# Patient Record
Sex: FEMALE | Race: WHITE | URBAN - METROPOLITAN AREA
[De-identification: names, ages, dates, MRNs, and addresses within clinical notes are randomized per-mention and may not be internally consistent; named-entity substitution may affect disease eponyms.]

---

## 2017-02-01 ENCOUNTER — OUTPATIENT (OUTPATIENT)
Dept: OUTPATIENT SERVICES | Facility: HOSPITAL | Age: 27
LOS: 1 days | Discharge: HOME | End: 2017-02-01

## 2017-06-27 DIAGNOSIS — R73.02 IMPAIRED GLUCOSE TOLERANCE (ORAL): ICD-10-CM

## 2017-06-27 DIAGNOSIS — E03.9 HYPOTHYROIDISM, UNSPECIFIED: ICD-10-CM

## 2017-08-09 ENCOUNTER — OUTPATIENT (OUTPATIENT)
Dept: OUTPATIENT SERVICES | Facility: HOSPITAL | Age: 27
LOS: 1 days | Discharge: HOME | End: 2017-08-09

## 2017-08-09 DIAGNOSIS — Z00.00 ENCOUNTER FOR GENERAL ADULT MEDICAL EXAMINATION WITHOUT ABNORMAL FINDINGS: ICD-10-CM

## 2017-08-09 DIAGNOSIS — O99.89 OTHER SPECIFIED DISEASES AND CONDITIONS COMPLICATING PREGNANCY, CHILDBIRTH AND THE PUERPERIUM: ICD-10-CM

## 2020-05-30 ENCOUNTER — EMERGENCY (EMERGENCY)
Facility: HOSPITAL | Age: 30
LOS: 0 days | Discharge: HOME | End: 2020-05-30
Attending: EMERGENCY MEDICINE | Admitting: EMERGENCY MEDICINE
Payer: MEDICAID

## 2020-05-30 VITALS
WEIGHT: 160.06 LBS | OXYGEN SATURATION: 100 % | DIASTOLIC BLOOD PRESSURE: 85 MMHG | SYSTOLIC BLOOD PRESSURE: 137 MMHG | HEART RATE: 100 BPM | RESPIRATION RATE: 19 BRPM | TEMPERATURE: 98 F | HEIGHT: 64 IN
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
## 2020-05-30 DIAGNOSIS — S92.354A NONDISPLACED FRACTURE OF FIFTH METATARSAL BONE, RIGHT FOOT, INITIAL ENCOUNTER FOR CLOSED FRACTURE: ICD-10-CM
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
## 2020-05-30 PROCEDURE — 29515 APPLICATION SHORT LEG SPLINT: CPT

## 2020-05-30 PROCEDURE — 73630 X-RAY EXAM OF FOOT: CPT | Mod: 26,RT

## 2020-05-30 PROCEDURE — 73610 X-RAY EXAM OF ANKLE: CPT | Mod: 26,RT

## 2020-05-30 PROCEDURE — 99284 EMERGENCY DEPT VISIT MOD MDM: CPT | Mod: 25

## 2020-05-30 RX ORDER — IBUPROFEN 200 MG
1 TABLET ORAL
Qty: 28 | Refills: 0
Start: 2020-05-30 | End: 2020-06-05

## 2020-05-30 NOTE — ED PROVIDER NOTE - CLINICAL SUMMARY MEDICAL DECISION MAKING FREE TEXT BOX
Patient presents for evaluation of foot pain worst at the lateral aspect after sustaining an eversion injury.  xray reveals 5th metatarsal fracture she was placed in splint and discharged with follow up to podiatry

## 2020-05-30 NOTE — ED ADULT NURSE NOTE - NSIMPLEMENTINTERV_GEN_ALL_ED
Implemented All Universal Safety Interventions:  [place] to call system. Call bell, personal items and telephone within reach. Instruct patient to call for assistance. Room bathroom lighting operational. Non-slip footwear when patient is off stretcher. Physically safe environment: no spills, clutter or unnecessary equipment. Stretcher in lowest position, wheels locked, appropriate side rails in place.

## 2020-05-30 NOTE — ED PROVIDER NOTE - ATTENDING CONTRIBUTION TO CARE
I was present for and supervised the key and critical aspects of the procedures performed during the care of the patient.  Patient presents for evaluation of foot pain worst at the lateral aspect after sustaining an eversion injury.  xray reveals 5th metatarsal fracture she was placed in splint and discharged with follow up to podiatry

## 2020-05-30 NOTE — ED PROVIDER NOTE - OBJECTIVE STATEMENT
30 y/o female presents to the ED c/o "I stepped on half filled bin to turn on ac and it fell in causing to twist and injure right foot/ ankle at 1 am. I have a lot of pain and swelling." no head trauma/ LOC

## 2020-05-30 NOTE — ED PROVIDER NOTE - NSFOLLOWUPCLINICS_GEN_ALL_ED_FT
[facility]  Podiatry  .  [place]   Phone: [phone]  Fax:   Follow Up Time:

## 2020-05-30 NOTE — ED PROVIDER NOTE - PATIENT PORTAL LINK FT
You can access the FollowMyHealth Patient Portal offered by [facility] by registering at the following website: [email or web address]. By joining [employer]’s FollowMyHealth portal, you will also be able to view your health information using other applications (apps) compatible with our system.

## 2020-10-12 ENCOUNTER — TRANSCRIPTION ENCOUNTER (OUTPATIENT)
Age: 30
End: 2020-10-12

## 2020-11-20 ENCOUNTER — OUTPATIENT (OUTPATIENT)
Dept: OUTPATIENT SERVICES | Facility: HOSPITAL | Age: 30
LOS: 1 days | Discharge: HOME | End: 2020-11-20
Payer: MEDICAID

## 2020-11-20 PROCEDURE — 58340 CATHETER FOR HYSTEROGRAPHY: CPT

## 2020-11-20 PROCEDURE — 74740 X-RAY FEMALE GENITAL TRACT: CPT | Mod: 26

## 2022-05-31 ENCOUNTER — APPOINTMENT (OUTPATIENT)
Dept: ENDOCRINOLOGY | Facility: CLINIC | Age: 32
End: 2022-05-31
Payer: COMMERCIAL

## 2022-05-31 ENCOUNTER — TRANSCRIPTION ENCOUNTER (OUTPATIENT)
Age: 32
End: 2022-05-31

## 2022-05-31 VITALS
WEIGHT: 170 LBS | TEMPERATURE: 97.2 F | DIASTOLIC BLOOD PRESSURE: 70 MMHG | SYSTOLIC BLOOD PRESSURE: 118 MMHG | OXYGEN SATURATION: 98 % | HEIGHT: 64 IN | HEART RATE: 72 BPM | BODY MASS INDEX: 29.02 KG/M2

## 2022-05-31 DIAGNOSIS — O99.281 ENDOCRINE, NUTRITIONAL AND METABOLIC DISEASES COMPLICATING PREGNANCY, FIRST TRIMESTER: ICD-10-CM

## 2022-05-31 DIAGNOSIS — Z80.0 FAMILY HISTORY OF MALIGNANT NEOPLASM OF DIGESTIVE ORGANS: ICD-10-CM

## 2022-05-31 DIAGNOSIS — Z82.49 FAMILY HISTORY OF ISCHEMIC HEART DISEASE AND OTHER DISEASES OF THE CIRCULATORY SYSTEM: ICD-10-CM

## 2022-05-31 DIAGNOSIS — E03.9 ENDOCRINE, NUTRITIONAL AND METABOLIC DISEASES COMPLICATING PREGNANCY, FIRST TRIMESTER: ICD-10-CM

## 2022-05-31 DIAGNOSIS — Z83.49 FAMILY HISTORY OF OTHER ENDOCRINE, NUTRITIONAL AND METABOLIC DISEASES: ICD-10-CM

## 2022-05-31 PROCEDURE — 99203 OFFICE O/P NEW LOW 30 MIN: CPT

## 2022-05-31 RX ORDER — LEVOTHYROXINE SODIUM 0.12 MG/1
125 TABLET ORAL DAILY
Qty: 90 | Refills: 2 | Status: ACTIVE | COMMUNITY
Start: 2022-05-31 | End: 1900-01-01

## 2022-05-31 NOTE — REVIEW OF SYSTEMS
[Fatigue] : no fatigue [Recent Weight Gain (___ Lbs)] : no recent weight gain [Recent Weight Loss (___ Lbs)] : no recent weight loss [Dysphagia] : no dysphagia [Neck Pain] : no neck pain [Dysphonia] : no dysphonia [Chest Pain] : no chest pain [Palpitations] : no palpitations [Fast Heart Rate] : heart rate is not fast [Lower Ext Edema] : no lower extremity edema [Shortness Of Breath] : no shortness of breath [SOB on Exertion] : no shortness of breath on exertion [Nausea] : no nausea [Constipation] : no constipation [Vomiting] : no vomiting [Diarrhea] : diarrhea [Acne] : acne [Dry Skin] : dry skin [Hirsutism] : hirsutism [Hair Loss] : no hair loss [Headaches] : no headaches [Dizziness] : no dizziness [Tremors] : no tremors [Cold Intolerance] : no cold intolerance [Heat Intolerance] : no heat intolerance

## 2022-05-31 NOTE — PAST MEDICAL HISTORY
[Definite ___ (Date)] : the last menstrual period was [unfilled] [Live Births ___] : P[unfilled]  [Abortions ___] : Abortions:[unfilled]

## 2022-05-31 NOTE — DATA REVIEWED
[FreeTextEntry1] : 5/18/22: TSH 2.83  Ft4 1.5  FSH <0.7  LH 0.2  progesterone 10.3 prolactin 11.3  estradiol 260  \par 5/24/22: HCG 7617

## 2022-05-31 NOTE — ASSESSMENT
[FreeTextEntry1] : 31 year old patient  with known hypothyroidism secondary to Hashimoto, previous abortions  who present for evaluation as she is now 6 weeks pregnant \par \par #hypothyroidism from Hashimoto thyroid disease / 1st trimester pregnancy \par - Now on Lt4 100 mcg daily , good pill technique , low dose biotin \par - TSH  TSH 2.83  Ft4 1.5 on Lt4 100 mcg daily \par - increase to 125 mcg daily given pregnancy and repeat in 8 weeks \par - feels better since on Gluten and dairy free diet  \par

## 2022-05-31 NOTE — HISTORY OF PRESENT ILLNESS
[FreeTextEntry1] : 31 year old patient  with known hypothyroidism secondary to Hashimoto who present for evaluation as she is now 6 weeks pregnant \par \par #Hashimoto\par - diagnosed when pregnant with her first son 6 years ago , was on Lt4 since , had preeclampsia with her first pregnancy too, no GDM \par - Now on Lt4 100 mcg daily , stable on this dose for around 1 year now , good pill technique , low dose biotin \par - feels better since on Gluten and dairy free diet  \par - denies nausea, vomiting, report diarrhea over past week, no abnormal hair loss cold/ heat intolerance , was hard for her to loose weight \par - had previous abortions, early in pregnancy

## 2022-05-31 NOTE — PHYSICAL EXAM
[Alert] : alert [Well Nourished] : well nourished [Healthy Appearance] : healthy appearance [No Acute Distress] : no acute distress [No Proptosis] : no proptosis [No Lid Lag] : no lid lag [No Respiratory Distress] : no respiratory distress [No Accessory Muscle Use] : no accessory muscle use [Clear to Auscultation] : lungs were clear to auscultation bilaterally [Normal S1, S2] : normal S1 and S2 [No Murmurs] : no murmurs [Regular Rhythm] : with a regular rhythm [No Edema] : no peripheral edema [No Stigmata of Cushings Syndrome] : no stigmata of Cushings Syndrome [Abdominal Striae] : abdominal striae present [Acanthosis Nigricans] : acanthosis nigricans present [No Tremors] : no tremors [Oriented x3] : oriented to person, place, and time [de-identified] : enlarged thyroid no palpable nodules  [de-identified] : non wide, gray

## 2022-06-01 ENCOUNTER — APPOINTMENT (OUTPATIENT)
Dept: ENDOCRINOLOGY | Facility: CLINIC | Age: 32
End: 2022-06-01

## 2022-06-20 ENCOUNTER — TRANSCRIPTION ENCOUNTER (OUTPATIENT)
Age: 32
End: 2022-06-20

## 2022-06-20 ENCOUNTER — INPATIENT (INPATIENT)
Facility: HOSPITAL | Age: 32
LOS: 0 days | Discharge: HOME | End: 2022-06-20
Attending: OBSTETRICS & GYNECOLOGY | Admitting: OBSTETRICS & GYNECOLOGY
Payer: COMMERCIAL

## 2022-06-20 ENCOUNTER — RESULT REVIEW (OUTPATIENT)
Age: 32
End: 2022-06-20

## 2022-06-20 VITALS
DIASTOLIC BLOOD PRESSURE: 73 MMHG | SYSTOLIC BLOOD PRESSURE: 124 MMHG | RESPIRATION RATE: 18 BRPM | HEART RATE: 64 BPM | OXYGEN SATURATION: 100 %

## 2022-06-20 VITALS
WEIGHT: 145.06 LBS | OXYGEN SATURATION: 99 % | HEIGHT: 64 IN | HEART RATE: 88 BPM | SYSTOLIC BLOOD PRESSURE: 117 MMHG | DIASTOLIC BLOOD PRESSURE: 67 MMHG | RESPIRATION RATE: 18 BRPM | TEMPERATURE: 99 F

## 2022-06-20 DIAGNOSIS — O02.1 MISSED ABORTION: ICD-10-CM

## 2022-06-20 LAB
ALBUMIN SERPL ELPH-MCNC: 4.9 G/DL — SIGNIFICANT CHANGE UP (ref 3.5–5.2)
ALP SERPL-CCNC: 69 U/L — SIGNIFICANT CHANGE UP (ref 30–115)
ALT FLD-CCNC: 32 U/L — SIGNIFICANT CHANGE UP (ref 0–41)
ANION GAP SERPL CALC-SCNC: 12 MMOL/L — SIGNIFICANT CHANGE UP (ref 7–14)
APTT BLD: 35.1 SEC — SIGNIFICANT CHANGE UP (ref 27–39.2)
AST SERPL-CCNC: 26 U/L — SIGNIFICANT CHANGE UP (ref 0–41)
BILIRUB SERPL-MCNC: 0.4 MG/DL — SIGNIFICANT CHANGE UP (ref 0.2–1.2)
BLD GP AB SCN SERPL QL: SIGNIFICANT CHANGE UP
BUN SERPL-MCNC: 12 MG/DL — SIGNIFICANT CHANGE UP (ref 10–20)
CALCIUM SERPL-MCNC: 9.4 MG/DL — SIGNIFICANT CHANGE UP (ref 8.5–10.1)
CHLORIDE SERPL-SCNC: 99 MMOL/L — SIGNIFICANT CHANGE UP (ref 98–110)
CO2 SERPL-SCNC: 23 MMOL/L — SIGNIFICANT CHANGE UP (ref 17–32)
CREAT SERPL-MCNC: 0.7 MG/DL — SIGNIFICANT CHANGE UP (ref 0.7–1.5)
EGFR: 120 ML/MIN/1.73M2 — SIGNIFICANT CHANGE UP
GLUCOSE SERPL-MCNC: 89 MG/DL — SIGNIFICANT CHANGE UP (ref 70–99)
HCG SERPL-ACNC: HIGH MIU/ML
HCT VFR BLD CALC: 43.4 % — SIGNIFICANT CHANGE UP (ref 37–47)
HGB BLD-MCNC: 14.1 G/DL — SIGNIFICANT CHANGE UP (ref 12–16)
INR BLD: 1.11 RATIO — SIGNIFICANT CHANGE UP (ref 0.65–1.3)
MCHC RBC-ENTMCNC: 27.1 PG — SIGNIFICANT CHANGE UP (ref 27–31)
MCHC RBC-ENTMCNC: 32.5 G/DL — SIGNIFICANT CHANGE UP (ref 32–37)
MCV RBC AUTO: 83.3 FL — SIGNIFICANT CHANGE UP (ref 81–99)
NRBC # BLD: 0 /100 WBCS — SIGNIFICANT CHANGE UP (ref 0–0)
PLATELET # BLD AUTO: 234 K/UL — SIGNIFICANT CHANGE UP (ref 130–400)
POTASSIUM SERPL-MCNC: 3.9 MMOL/L — SIGNIFICANT CHANGE UP (ref 3.5–5)
POTASSIUM SERPL-SCNC: 3.9 MMOL/L — SIGNIFICANT CHANGE UP (ref 3.5–5)
PROT SERPL-MCNC: 7.5 G/DL — SIGNIFICANT CHANGE UP (ref 6–8)
PROTHROM AB SERPL-ACNC: 12.7 SEC — SIGNIFICANT CHANGE UP (ref 9.95–12.87)
RBC # BLD: 5.21 M/UL — SIGNIFICANT CHANGE UP (ref 4.2–5.4)
RBC # FLD: 14.9 % — HIGH (ref 11.5–14.5)
SARS-COV-2 RNA SPEC QL NAA+PROBE: SIGNIFICANT CHANGE UP
SODIUM SERPL-SCNC: 134 MMOL/L — LOW (ref 135–146)
WBC # BLD: 9.71 K/UL — SIGNIFICANT CHANGE UP (ref 4.8–10.8)
WBC # FLD AUTO: 9.71 K/UL — SIGNIFICANT CHANGE UP (ref 4.8–10.8)

## 2022-06-20 PROCEDURE — 88305 TISSUE EXAM BY PATHOLOGIST: CPT | Mod: 26

## 2022-06-20 PROCEDURE — 99285 EMERGENCY DEPT VISIT HI MDM: CPT

## 2022-06-20 RX ORDER — HYDROMORPHONE HYDROCHLORIDE 2 MG/ML
0.5 INJECTION INTRAMUSCULAR; INTRAVENOUS; SUBCUTANEOUS
Refills: 0 | Status: DISCONTINUED | OUTPATIENT
Start: 2022-06-20 | End: 2022-06-20

## 2022-06-20 RX ORDER — ONDANSETRON 8 MG/1
4 TABLET, FILM COATED ORAL ONCE
Refills: 0 | Status: DISCONTINUED | OUTPATIENT
Start: 2022-06-20 | End: 2022-06-20

## 2022-06-20 RX ORDER — SODIUM CHLORIDE 9 MG/ML
1000 INJECTION, SOLUTION INTRAVENOUS
Refills: 0 | Status: DISCONTINUED | OUTPATIENT
Start: 2022-06-20 | End: 2022-06-20

## 2022-06-20 RX ADMIN — SODIUM CHLORIDE 100 MILLILITER(S): 9 INJECTION, SOLUTION INTRAVENOUS at 19:33

## 2022-06-20 NOTE — ED PROVIDER NOTE - OBJECTIVE STATEMENT
this is a 30 yo female presents to ed for evaluation. patient was having miscarriage. dr ponce is going to do a procedure on patient.

## 2022-06-20 NOTE — ED ADULT NURSE NOTE - NSIMPLEMENTINTERV_GEN_ALL_ED
Implemented All Universal Safety Interventions:  [place] to call system. Call bell, personal items and telephone within reach. Instruct patient to call for assistance. Room bathroom lighting operational. Non-slip footwear when patient is off stretcher. Physically safe environment: no spills, clutter or unnecessary equipment. Stretcher in lowest position, wheels locked, appropriate side rails in place.

## 2022-06-20 NOTE — ED PROVIDER NOTE - CLINICAL SUMMARY MEDICAL DECISION MAKING FREE TEXT BOX
31-year-old female with a past medical history of hypothyroid G5, P1 sent in by her OB/GYN Dr. Marquez for admission.  Patient states last menstrual period was April 19 followed up with Dr. Sung on Friday and was told she was having a miscarriage as there was no fetal heart rate on ultrasound.  Patient states began having mild vaginal spotting today.  No fevers chills cough nausea vomiting abdominal pain.   VS reviewed ob consulted- patient admitted for OR.

## 2022-06-20 NOTE — ED PROVIDER NOTE - ATTENDING APP SHARED VISIT CONTRIBUTION OF CARE
I personally evaluated the patient. I reviewed the Resident’s or Physician Assistant’s note (as assigned above), and agree with the findings and plan except as documented in my note.  31-year-old female with a past medical history of hypothyroid G5, P1 sent in by her OB/GYN Dr. Marquez for admission.  Patient states last menstrual period was April 19 followed up with Dr. Sung on Friday and was told she was having a miscarriage as there was no fetal heart rate on ultrasound.  Patient states began having mild vaginal spotting today.  No fevers chills cough nausea vomiting abdominal pain.    CONSTITUTIONAL: WA / WN / NAD  HEAD: NCAT  EYES: PERRL; EOMI;   ENT: Normal pharynx; mucous membranes pink/moist, no erythema.  NECK: Supple; no meningeal signs  CARD: RRR; nl S1/S2; no M/R/G.   RESP: Respiratory rate and effort are normal; breath sounds clear and equal bilaterally.  ABD: Soft, NT ND   MSK/EXT: No gross deformities; full range of motion.  SKIN: Warm and dry;   NEURO: AAOx3,   PSYCH: Memory Intact, Normal Affect

## 2022-06-20 NOTE — ASU DISCHARGE PLAN (ADULT/PEDIATRIC) - CARE PROVIDER_API CALL
Jes Sung)  Obstetrics and Gynecology  [address] [place] [address]  Phone: [phone]  Fax: [phone]  Follow Up Time:

## 2022-06-20 NOTE — ED PROVIDER NOTE - NS ED ROS FT
Review of Systems:  	•	CONSTITUTIONAL - no fever, no diaphoresis, no chills  	•	SKIN - no rash  	•	HEMATOLOGIC - no bleeding, no bruising  	•	EYES - no eye pain, no blurry vision  	•	ENT - no change in hearing, no sore throat, no ear pain or tinnitus  	•	RESPIRATORY - no shortness of breath, no cough  	•	CARDIAC - no chest pain, no palpitations  	•	GI - lower abdominal cramping , no nausea, no vomiting, no diarrhea, no constipation  	•	GENITO-URINARY - no discharge, no dysuria; no hematuria, no increased urinary frequency

## 2022-06-20 NOTE — ED PROVIDER NOTE - NS ED ATTENDING STATEMENT MOD
This was a shared visit with the JOHNNY. I reviewed and verified the documentation and independently performed the documented:

## 2022-06-20 NOTE — ED PROVIDER NOTE - IV ALTEPLASE INCLUSION HIDDEN
The Service to Dermatology order in workqueue 55511 requested on 12/10/2019 has been removed as, patient symptoms resolved. Ordering provider has been notified.    Please contact patient, if further communication is needed.  
show

## 2022-06-20 NOTE — ASU DISCHARGE PLAN (ADULT/PEDIATRIC) - RETURN TO WORK/SCHOOL
"Goal Outcome Evaluation:  Plan of Care Reviewed With: patient  Progress: improving   Patient not motivated to drink or walk, discussed both and patient agreed to put forth effort. Patient able to walk multiple times and increase fluid intake to 310 ml. States she did not understand pre-op teachings and has trouble reading her \"white binder\" of instructions. Discharge teaching done . Patient verbalizes understanding of discharge education.   "
Goal Outcome Evaluation:  Plan of Care Reviewed With: patient  Progress: improving  Outcome Summary: enouraged to walk and push po fluids. painful trying to get oob. anticipate d/c home.  
No...

## 2022-06-20 NOTE — CHART NOTE - NSCHARTNOTEFT_GEN_A_CORE
PACU ANESTHESIA ADMISSION NOTE      Procedure:   Post op diagnosis:      ____  Intubated  TV:______       Rate: ______      FiO2: ______    ___x_  Patent Airway    ___x_  Full return of protective reflexes    ____  Full recovery from anesthesia / back to baseline     Vitals:   T:   98.1        R:  18               BP:    111/60              Sat:     96              P: 71      Mental Status:  __x__ Awake   ___x__ Alert   _____ Drowsy   _____ Sedated    Nausea/Vomiting:  __x__ NO  ______Yes,   See Post - Op Orders          Pain Scale (0-10):  __x___    Treatment: ____ None    ____ See Post - Op/PCA Orders    Post - Operative Fluids:   ____ Oral   __x__ See Post - Op Orders    Plan: Discharge:   __x__Home       _____Floor     _____Critical Care    _____  Other:_________________    Comments:

## 2022-06-22 LAB — SURGICAL PATHOLOGY STUDY: SIGNIFICANT CHANGE UP

## 2022-06-24 DIAGNOSIS — E03.9 HYPOTHYROIDISM, UNSPECIFIED: ICD-10-CM

## 2022-06-24 DIAGNOSIS — Z20.822 CONTACT WITH AND (SUSPECTED) EXPOSURE TO COVID-19: ICD-10-CM

## 2022-06-24 DIAGNOSIS — O99.281 ENDOCRINE, NUTRITIONAL AND METABOLIC DISEASES COMPLICATING PREGNANCY, FIRST TRIMESTER: ICD-10-CM

## 2022-06-24 DIAGNOSIS — O02.1 MISSED ABORTION: ICD-10-CM

## 2022-06-27 ENCOUNTER — NON-APPOINTMENT (OUTPATIENT)
Age: 32
End: 2022-06-27

## 2022-07-15 LAB — CHROM ANALY OVERALL INTERP SPEC-IMP: SIGNIFICANT CHANGE UP

## 2022-07-27 ENCOUNTER — EMERGENCY (EMERGENCY)
Facility: HOSPITAL | Age: 32
LOS: 0 days | Discharge: HOME | End: 2022-07-27
Attending: EMERGENCY MEDICINE | Admitting: EMERGENCY MEDICINE

## 2022-07-27 VITALS
TEMPERATURE: 97 F | WEIGHT: 149.91 LBS | SYSTOLIC BLOOD PRESSURE: 123 MMHG | HEIGHT: 64 IN | DIASTOLIC BLOOD PRESSURE: 85 MMHG | RESPIRATION RATE: 18 BRPM | HEART RATE: 96 BPM | OXYGEN SATURATION: 100 %

## 2022-07-27 DIAGNOSIS — J06.9 ACUTE UPPER RESPIRATORY INFECTION, UNSPECIFIED: ICD-10-CM

## 2022-07-27 DIAGNOSIS — R07.89 OTHER CHEST PAIN: ICD-10-CM

## 2022-07-27 DIAGNOSIS — J02.9 ACUTE PHARYNGITIS, UNSPECIFIED: ICD-10-CM

## 2022-07-27 DIAGNOSIS — E06.3 AUTOIMMUNE THYROIDITIS: ICD-10-CM

## 2022-07-27 DIAGNOSIS — E03.9 HYPOTHYROIDISM, UNSPECIFIED: ICD-10-CM

## 2022-07-27 DIAGNOSIS — R05.1 ACUTE COUGH: ICD-10-CM

## 2022-07-27 PROCEDURE — 99284 EMERGENCY DEPT VISIT MOD MDM: CPT

## 2022-07-27 PROCEDURE — 93010 ELECTROCARDIOGRAM REPORT: CPT

## 2022-07-27 RX ORDER — ALBUTEROL 90 UG/1
2 AEROSOL, METERED ORAL
Qty: 1 | Refills: 0
Start: 2022-07-27 | End: 2022-07-29

## 2022-07-27 RX ORDER — LEVOTHYROXINE SODIUM 125 MCG
1 TABLET ORAL
Qty: 0 | Refills: 0 | DISCHARGE

## 2022-07-27 RX ORDER — ALBUTEROL 90 UG/1
2.5 AEROSOL, METERED ORAL ONCE
Refills: 0 | Status: COMPLETED | OUTPATIENT
Start: 2022-07-27 | End: 2022-07-27

## 2022-07-27 RX ADMIN — ALBUTEROL 2.5 MILLIGRAM(S): 90 AEROSOL, METERED ORAL at 09:44

## 2022-07-27 NOTE — ED PROVIDER NOTE - CARE PROVIDER_API CALL
Constantino Santana (DO)  Critical Care Medicine; Pulmonary Disease; Sleep Medicine  [address] [place] [address], [place] [address]  Phone: [phone]  Fax: [phone]  Follow Up Time:

## 2022-07-27 NOTE — ED PROVIDER NOTE - NSFOLLOWUPINSTRUCTIONS_ED_ALL_ED_FT
rest , Use albuterol inhaler every 6 hours as needed, See Pulm MD for follow up next week, Return to ED for worsening Shortness of breath, chest pain, bleeding in sputum,

## 2022-07-27 NOTE — ED PROVIDER NOTE - PATIENT PORTAL LINK FT
You can access the FollowMyHealth Patient Portal offered by [facility] by registering at the following website: [email or web address]. By joining [employer]’s FollowMyHealth portal, you will also be able to view your health information using other applications (apps) compatible with our system.

## 2022-07-27 NOTE — ED PROVIDER NOTE - OBJECTIVE STATEMENT
Patient c/o tightness in chest, Was exposed to chlorine  several days ago with burn sx in chest, Next day c/o cough, tightness, sore throat , temp 101, See by [facility] neg covid test, CXR normal placed on ABX, today coughed up sputum with small amt of blood mixed in.

## 2022-07-27 NOTE — ED PROVIDER NOTE - ATTENDING APP SHARED VISIT CONTRIBUTION OF CARE
Patient is a 31-year-old female with Hashimoto's who comes in for cough productive of yellowish sputum with blood tingeing.  Fever yesterday.  Went to urgent care and was prescribed amoxicillin.  Chest x-ray was unremarkable.    Exam: Regular rate and rhythm, bilateral breath sounds, no increased work of breathing, no tachypnea, no lower extremity edema, no calf tenderness, no acute distress  Plan: EKG, albuterol

## 2022-08-10 DIAGNOSIS — E03.8 OTHER SPECIFIED HYPOTHYROIDISM: ICD-10-CM

## 2022-08-10 DIAGNOSIS — E06.3 OTHER SPECIFIED HYPOTHYROIDISM: ICD-10-CM

## 2022-08-17 ENCOUNTER — APPOINTMENT (OUTPATIENT)
Dept: RHEUMATOLOGY | Facility: CLINIC | Age: 32
End: 2022-08-17

## 2022-08-17 ENCOUNTER — NON-APPOINTMENT (OUTPATIENT)
Age: 32
End: 2022-08-17

## 2022-08-17 VITALS
DIASTOLIC BLOOD PRESSURE: 86 MMHG | TEMPERATURE: 98.1 F | BODY MASS INDEX: 30.73 KG/M2 | SYSTOLIC BLOOD PRESSURE: 112 MMHG | OXYGEN SATURATION: 99 % | HEIGHT: 64 IN | HEART RATE: 72 BPM | WEIGHT: 180 LBS

## 2022-08-17 PROCEDURE — 99204 OFFICE O/P NEW MOD 45 MIN: CPT

## 2022-08-17 RX ORDER — ASPIRIN ENTERIC COATED TABLETS 81 MG 81 MG/1
81 TABLET, DELAYED RELEASE ORAL
Refills: 0 | Status: ACTIVE | COMMUNITY

## 2022-08-17 RX ORDER — LEVOTHYROXINE SODIUM 0.12 MG/1
125 TABLET ORAL DAILY
Qty: 90 | Refills: 2 | Status: DISCONTINUED | COMMUNITY
Start: 2022-08-10 | End: 2022-08-17

## 2022-08-17 NOTE — HISTORY OF PRESENT ILLNESS
[FreeTextEntry1] : Pt has a h/o Hashimoto's thyroiditis, which was diagnosed when she was pregnant with her son approx 7 years ago. 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 She does not know why she is having the miscarriages; she had genetic testing on the fetus and everything came back normal. She had labs done with CLYDE 1:320 and elevated CRP to 11; otherwise there were no other abnormalities found. Tests for beta 2 GP 1 IgG and IgM, and cardiolipin IgG and IgM, were negative.\par \par + Dry eyes but pt wears contacts. Denies dysphagia or dry mouth, rashes, photosensitive rash, hair loss, oral ulcers, joint pain, stiffness or swelling, Raynaud's, kidney problems, heart problems, or personal or family h/o blood clots.\par \par Physical exam: GEN: Pleasant, AAO woman sitting on exam table in NAD\par SKIN: No rashes\par HEAD: No alopecia\par EYES: No lacrimal gland enlargement\par MOUTH: Moist mucous membranes, no oral ulcers, normal oral aperture\par ENT: No LAD\par PULM: Clear to auscultation b/l\par CV: Regular rate and rhythm, no murmurs\par MSK:\par Shoulders: Full ROM b/l\par Elbows: Full ROM b/l, no effusions\par Wrists: Full ROM b/l, no effusions\par Hands: no synovitis\par Hips: Full ROM b/l\par Knees: no effusions, full ROM b/l\par Ankles: no effusions, full ROM b/l\par Feet: no effusions, no TTP\par EXT: Normal nailfold capillaries, no LE edema b/l

## 2022-08-17 NOTE — REASON FOR VISIT
[Initial Evaluation] : an initial evaluation [FreeTextEntry1] : Positive CLYDE and recurrent miscarriages

## 2022-08-17 NOTE — ASSESSMENT
[FreeTextEntry1] : Positive CLYDE: Although pt has no other symptoms or physical exam findings suggestive of a systemic connective tissue disease and Hashimoto's thyroiditis could be a/w positive CLYDE, it is possible that her miscarriages (which occurred in the first or early second trimester) could be connected to an autoimmune etiology. She was tested for two antiphospholipid labs but did not have a lupus anticoagulant checked per [facility] records. Pt was also not checked for Ro antibody, which may be a/w second trimester fetal heart block. \par - f/u additional labs for +CLYDE including Ro antibody and lupus anticoagulant

## 2022-08-22 ENCOUNTER — APPOINTMENT (OUTPATIENT)
Dept: ENDOCRINOLOGY | Facility: CLINIC | Age: 32
End: 2022-08-22

## 2022-08-25 ENCOUNTER — NON-APPOINTMENT (OUTPATIENT)
Age: 32
End: 2022-08-25

## 2022-08-25 DIAGNOSIS — R76.8 OTHER SPECIFIED ABNORMAL IMMUNOLOGICAL FINDINGS IN SERUM: ICD-10-CM

## 2022-10-31 ENCOUNTER — APPOINTMENT (OUTPATIENT)
Dept: ENDOCRINOLOGY | Facility: CLINIC | Age: 32
End: 2022-10-31

## 2023-05-12 NOTE — ASU DISCHARGE PLAN (ADULT/PEDIATRIC) - ASU DISCHARGE DATE/TIME
no chills/no decreased eating/drinking/no dizziness/no tingling/no vomiting/no weakness 20-Jun-2022 20:20

## 2023-09-03 ENCOUNTER — EMERGENCY (EMERGENCY)
Facility: HOSPITAL | Age: 33
LOS: 0 days | Discharge: HOME | End: 2023-09-03
Attending: EMERGENCY MEDICINE
Payer: COMMERCIAL

## 2023-09-03 VITALS
RESPIRATION RATE: 20 BRPM | SYSTOLIC BLOOD PRESSURE: 136 MMHG | WEIGHT: 190.04 LBS | TEMPERATURE: 98 F | HEART RATE: 79 BPM | OXYGEN SATURATION: 98 % | DIASTOLIC BLOOD PRESSURE: 68 MMHG | HEIGHT: 64 IN
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
## 2023-09-03 DIAGNOSIS — S05.02XA INJURY OF CONJUNCTIVA AND CORNEAL ABRASION WITHOUT FOREIGN BODY, LEFT EYE, INITIAL ENCOUNTER: ICD-10-CM

## 2023-09-03 DIAGNOSIS — E03.9 HYPOTHYROIDISM, UNSPECIFIED: ICD-10-CM
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
## 2023-09-03 DIAGNOSIS — H57.12 OCULAR PAIN, LEFT EYE: ICD-10-CM

## 2023-09-03 PROCEDURE — 99284 EMERGENCY DEPT VISIT MOD MDM: CPT

## 2023-09-03 PROCEDURE — 99283 EMERGENCY DEPT VISIT LOW MDM: CPT

## 2023-09-03 RX ORDER — CIPROFLOXACIN HCL 0.3 %
1 DROPS OPHTHALMIC (EYE)
Refills: 0 | Status: DISCONTINUED | OUTPATIENT
Start: 2023-09-03 | End: 2023-09-03

## 2023-09-03 RX ADMIN — Medication 1 DROP(S): at 17:47

## 2023-09-03 NOTE — ED PROVIDER NOTE - PATIENT PORTAL LINK FT
You can access the FollowMyHealth Patient Portal offered by [facility] by registering at the following website: [email or web address]. By joining [employer]’s FollowMyHealth portal, you will also be able to view your health information using other applications (apps) compatible with our system.

## 2023-09-03 NOTE — ED PROVIDER NOTE - NSFOLLOWUPINSTRUCTIONS_ED_ALL_ED_FT
Our Emergency Department Referral Coordinators will be reaching out to you in the next 24-48 hours from 9:00am to 5:00pm with a follow up appointment. Please expect a phone call from the hospital in that time frame. If you do not receive a call or if you have any questions or concerns, you can reach them at   [phone]      Corneal Abrasion    The cornea is the clear covering at the front and center of the eye. This very thin tissue is made up of many layers. If a scratch or injury causes the corneal epithelium to come off, it is called a corneal abrasion. Symptoms include eye pain, redness, tearing, difficulty keeping eye open, and light sensitivity. Do not drive or operate machinery if your eye is patched.  Antibiotic eye drops may be prescribed to reduce the risk of infection.  It is important to follow up with an ophthalmologist (eye doctor) to ensure proper healing.    SEEK IMMEDIATE MEDICAL CARE IF YOU HAVE ANY OF THE FOLLOWING SYMPTOMS: discharge from eyes, changes in vision, fever, or swelling.

## 2023-09-03 NOTE — ED ADULT NURSE NOTE - NSFALLUNIVINTERV_ED_ALL_ED
Bed/Stretcher in lowest position, wheels locked, appropriate side rails in place/Call bell, personal items and telephone in reach/Instruct patient to call for assistance before getting out of bed/chair/stretcher/Non-slip footwear applied when patient is off stretcher/[place] to call system/Physically safe environment - no spills, clutter or unnecessary equipment/Purposeful proactive rounding/Room/bathroom lighting operational, light cord in reach

## 2023-09-03 NOTE — ED PROVIDER NOTE - PHYSICAL EXAMINATION
Vital Signs: I have reviewed the initial vital signs.  Constitutional: well-nourished, appears stated age, no acute distress  Head: atraumatic and normocephalic  Eyes:PERRLA, EOMI, no nystagmus, left injected conjunctiva, +fluorscein uptake at 12 o'clock, no foreign body above eyelid  ENT: MMM  msk: no cervical tenderness, gait steady   Neuro: awake, alert, follows commands, oriented, no focal deficits, GCS 15  ;

## 2023-09-03 NOTE — ED PROVIDER NOTE - OBJECTIVE STATEMENT
31 y/o contact user presents to the ED with left eye irritation , redness, light sensitivity x 2 days. patient without any purulent drainage. no diplopia. no neck or back pain . patient symptoms denies any trauma to eye

## 2023-09-03 NOTE — ED PROVIDER NOTE - ATTENDING APP SHARED VISIT CONTRIBUTION OF CARE
33 yo F presents with left sided eye pain and irritation for the last 2 days. Pt wears contacts, no h/o trauma, On exam pt in NAD AAO x 3, PERRL, EOMI, + injected left eye, + small area of fluorescin uptake at 12 o'clock.

## 2023-09-03 NOTE — ED ADULT NURSE NOTE - DRUG PRE-SCREENING (DAST -1)
Pt notified of his C1D1 appointments and port placement with instructions. Pt states understanding and he will call if he has any questions or concerns prior to then.    Statement Selected

## 2024-07-31 PROBLEM — E06.3 HYPOTHYROIDISM DUE TO HASHIMOTO'S THYROIDITIS: Status: ACTIVE | Noted: 2022-08-10

## 2025-09-02 ENCOUNTER — NON-APPOINTMENT (OUTPATIENT)
Age: 35
End: 2025-09-02

## 2025-09-07 ENCOUNTER — NON-APPOINTMENT (OUTPATIENT)
Age: 35
End: 2025-09-07

## 2025-09-16 ENCOUNTER — NON-APPOINTMENT (OUTPATIENT)
Age: 35
End: 2025-09-16